# Patient Record
Sex: MALE | Race: WHITE | NOT HISPANIC OR LATINO | Employment: OTHER | URBAN - NONMETROPOLITAN AREA
[De-identification: names, ages, dates, MRNs, and addresses within clinical notes are randomized per-mention and may not be internally consistent; named-entity substitution may affect disease eponyms.]

---

## 2019-11-06 ENCOUNTER — OFFICE VISIT (OUTPATIENT)
Dept: CARDIOLOGY | Facility: CLINIC | Age: 69
End: 2019-11-06

## 2019-11-06 VITALS
WEIGHT: 208 LBS | BODY MASS INDEX: 32.65 KG/M2 | DIASTOLIC BLOOD PRESSURE: 64 MMHG | HEIGHT: 67 IN | OXYGEN SATURATION: 96 % | SYSTOLIC BLOOD PRESSURE: 130 MMHG | HEART RATE: 84 BPM

## 2019-11-06 DIAGNOSIS — I25.10 CORONARY ARTERY DISEASE INVOLVING NATIVE CORONARY ARTERY OF NATIVE HEART WITHOUT ANGINA PECTORIS: Primary | ICD-10-CM

## 2019-11-06 DIAGNOSIS — I99.8 SILENT ISCHEMIA: ICD-10-CM

## 2019-11-06 DIAGNOSIS — I10 ESSENTIAL HYPERTENSION: ICD-10-CM

## 2019-11-06 DIAGNOSIS — E10.65 TYPE 1 DIABETES MELLITUS WITH HYPERGLYCEMIA (HCC): ICD-10-CM

## 2019-11-06 DIAGNOSIS — I50.22 CHRONIC SYSTOLIC CONGESTIVE HEART FAILURE (HCC): ICD-10-CM

## 2019-11-06 DIAGNOSIS — E78.5 HYPERLIPIDEMIA, UNSPECIFIED HYPERLIPIDEMIA TYPE: ICD-10-CM

## 2019-11-06 PROCEDURE — 93000 ELECTROCARDIOGRAM COMPLETE: CPT | Performed by: INTERNAL MEDICINE

## 2019-11-06 PROCEDURE — 99204 OFFICE O/P NEW MOD 45 MIN: CPT | Performed by: INTERNAL MEDICINE

## 2019-11-06 RX ORDER — CYCLOBENZAPRINE HCL 10 MG
10 TABLET ORAL
COMMUNITY

## 2019-11-06 RX ORDER — SUCRALFATE 1 G/1
TABLET ORAL
Refills: 3 | COMMUNITY
Start: 2019-10-01

## 2019-11-06 RX ORDER — INFLUENZA A VIRUS A/MICHIGAN/45/2015 X-275 (H1N1) ANTIGEN (FORMALDEHYDE INACTIVATED), INFLUENZA A VIRUS A/SINGAPORE/INFIMH-16-0019/2016 IVR-186 (H3N2) ANTIGEN (FORMALDEHYDE INACTIVATED), AND INFLUENZA B VIRUS B/MARYLAND/15/2016 BX-69A (A B/COLORADO/6/2017-LIKE VIRUS) ANTIGEN (FORMALDEHYDE INACTIVATED) 60; 60; 60 UG/.5ML; UG/.5ML; UG/.5ML
INJECTION, SUSPENSION INTRAMUSCULAR
Refills: 0 | COMMUNITY
Start: 2019-10-17

## 2019-11-06 RX ORDER — BETAMETHASONE DIPROPIONATE 0.05 %
OINTMENT (GRAM) TOPICAL 2 TIMES DAILY
COMMUNITY

## 2019-11-06 RX ORDER — NITROGLYCERIN 0.4 MG/1
0.4 TABLET SUBLINGUAL
COMMUNITY
Start: 2013-05-16

## 2019-11-06 RX ORDER — TRAMADOL HYDROCHLORIDE 50 MG/1
50 TABLET ORAL
COMMUNITY
End: 2022-09-20

## 2019-11-06 RX ORDER — OLMESARTAN MEDOXOMIL, AMLODIPINE AND HYDROCHLOROTHIAZIDE TABLET 40/10/25 MG 40; 10; 25 MG/1; MG/1; MG/1
1 TABLET ORAL DAILY
Refills: 3 | COMMUNITY
Start: 2019-09-15

## 2019-11-06 RX ORDER — PRAVASTATIN SODIUM 80 MG/1
80 TABLET ORAL
COMMUNITY
Start: 2013-05-16

## 2019-11-06 RX ORDER — HEPATITIS A VACCINE 1440 [IU]/ML
INJECTION, SUSPENSION INTRAMUSCULAR
Refills: 0 | COMMUNITY
Start: 2019-10-17

## 2019-11-06 RX ORDER — ISOSORBIDE MONONITRATE 60 MG/1
60 TABLET, EXTENDED RELEASE ORAL DAILY
Refills: 3 | COMMUNITY
Start: 2019-10-01

## 2019-11-06 RX ORDER — GLIPIZIDE 5 MG/1
TABLET ORAL
Refills: 3 | COMMUNITY
Start: 2019-10-17

## 2019-11-06 RX ORDER — DIPHENHYDRAMINE HCL 25 MG
100 CAPSULE ORAL
COMMUNITY

## 2019-11-06 RX ORDER — CARVEDILOL 12.5 MG/1
12.5 TABLET ORAL
COMMUNITY
Start: 2013-05-16

## 2019-11-06 RX ORDER — FENOFIBRATE 145 MG/1
TABLET, COATED ORAL
Refills: 3 | COMMUNITY
Start: 2019-09-12

## 2019-11-06 RX ORDER — ASPIRIN 325 MG
325 TABLET, DELAYED RELEASE (ENTERIC COATED) ORAL
COMMUNITY

## 2019-11-06 RX ORDER — DAPAGLIFLOZIN 10 MG/1
TABLET, FILM COATED ORAL
COMMUNITY
Start: 2019-10-09

## 2019-11-06 RX ORDER — CLOPIDOGREL BISULFATE 75 MG/1
75 TABLET ORAL EVERY MORNING
Refills: 3 | COMMUNITY
Start: 2019-10-25

## 2019-11-06 RX ORDER — FLASH GLUCOSE SENSOR
KIT MISCELLANEOUS
COMMUNITY
Start: 2019-11-04

## 2019-11-06 RX ORDER — RANITIDINE 300 MG/1
300 TABLET ORAL DAILY
Refills: 3 | COMMUNITY
Start: 2019-09-12 | End: 2022-09-20

## 2019-11-06 RX ORDER — ACYCLOVIR 800 MG/1
TABLET ORAL
Refills: 0 | COMMUNITY
Start: 2019-10-03

## 2019-11-06 RX ORDER — FLURBIPROFEN SODIUM 0.3 MG/ML
SOLUTION/ DROPS OPHTHALMIC
Refills: 11 | COMMUNITY
Start: 2019-09-02

## 2019-11-06 NOTE — PROGRESS NOTES
"    Subjective:     Encounter Date:11/06/2019      Patient ID: Lester Maldonado is a 68 y.o. male.    Chief Complaint: Coronary artery disease  HPI  This is a 68-year-old former patient of mine from when I was practicing in [facility] who presents to our clinic for a second opinion regarding progression of coronary artery disease.  The patient had a \"silent\" myocardial infarction in 2010.  At that time the patient presented with fatigue and his wife was concerned that she thought his skin color was unusually pale and sallow.  The patient had no chest discomfort chest pressure or chest pain.  There was no shortness of breath nausea or diaphoresis.  His twelve-lead electrocardiogram however showed evidence of anterior ST elevation consistent with an evolving anterior ST elevation myocardial infarction.  Urgent coronary angiography disclosed an occluded proximal LAD which was treated with drug-eluting stent placement.  The patient did well over the next several years and has seen several cardiologist after I left the area.  The patient indicates that he recently changed cardiologist and was concerned based on his \"silent\" presentation that he had not had an ischemic evaluation in several years.  The patient underwent a vasodilator nuclear stress test which demonstrated what was thought to be inferior ischemia with normal viability of the anterior wall and a calculated ejection fraction of 48%.  His previous ejection fraction by echocardiogram had been 55%.  The patient underwent repeat coronary angiography last month in [facility] by 1 of my former colleagues.  This demonstrated complete occlusion of the proximal left anterior descending which appeared to have also been a \"silent\".  The patient had also developed an 80+ percent stenosis in a large proximal OM1.  The ostial left main had minor nonobstructive plaque.  The remainder of the distal circumflex had no focal obstructive disease and was a overall " small vessel consistent with diabetic-type atherosclerosis.  The right coronary artery was a large-caliber dominant vessel with 40-50% disease in the distal RCA and PDA.  Initially the patient was advised to continue ongoing medical therapy.  However, given his diabetes and mild decrease in left ventricular systolic function as well as coronary anatomy representing a left main equivalent, but more importantly, true silent ischemia, the patient and his wife requested a second opinion regarding additional options including surgical revascularization.  The patient currently leads an active lifestyle and denies exertional chest arm neck jaw shoulder back discomfort.  He denies having shortness of breath at rest or with activity.  He has no orthopnea PND or lower extremity edema.  There is no dizziness palpitations or syncope.  He has a history of hypertension, long-standing insulin requiring diabetes without renal failure and hyperlipidemia all of which are treated appropriately.  He is a former smoker but has not smoked in many years.  The following portions of the patient's history were reviewed and updated as appropriate: allergies, current medications, past family history, past medical history, past social history, past surgical history and problem  Review of Systems   Constitution: Negative for chills, diaphoresis, fever, weakness, malaise/fatigue, weight gain and weight loss.   HENT: Negative for ear discharge, hearing loss, hoarse voice and nosebleeds.    Eyes: Negative for discharge, double vision, pain and photophobia.   Cardiovascular: Negative for chest pain, claudication, cyanosis, dyspnea on exertion, irregular heartbeat, leg swelling, near-syncope, orthopnea, palpitations, paroxysmal nocturnal dyspnea and syncope.   Respiratory: Negative for cough, hemoptysis, shortness of breath, sputum production and wheezing.    Endocrine: Negative for cold intolerance, heat intolerance, polydipsia, polyphagia and  polyuria.   Hematologic/Lymphatic: Negative for adenopathy and bleeding problem. Does not bruise/bleed easily.   Skin: Negative for color change, flushing, itching and rash.   Musculoskeletal: Negative for muscle cramps, muscle weakness, myalgias and stiffness.   Gastrointestinal: Negative for abdominal pain, diarrhea, hematemesis, hematochezia, nausea and vomiting.   Genitourinary: Negative for dysuria, frequency and nocturia.   Neurological: Negative for focal weakness, loss of balance, numbness, paresthesias and seizures.   Psychiatric/Behavioral: Negative for altered mental status, hallucinations and suicidal ideas.   Allergic/Immunologic: Negative for HIV exposure, hives and persistent infections.           Current Outpatient Medications:   •  acyclovir (ZOVIRAX) 800 MG tablet, TAKE 1 TABLET EVERY 4 HOURS, 5 TIMES DAILY FOR 7 DAYS WHEN NEEDED FOR FEVER BLISTER, Disp: , Rfl: 0  •  aspirin  MG tablet, Take 325 mg by mouth., Disp: , Rfl:   •  B-D UF III MINI PEN NEEDLES 31G X 5 MM misc, FOUR TIMES DAILY WITH INSULIN PENS, Disp: , Rfl: 11  •  betamethasone dipropionate (DIPROLENE) 0.05 % ointment, Apply  topically to the appropriate area as directed 2 (Two) Times a Day., Disp: , Rfl:   •  carvedilol (COREG) 12.5 MG tablet, Take 12.5 mg by mouth., Disp: , Rfl:   •  clopidogrel (PLAVIX) 75 MG tablet, Take 75 mg by mouth Every Morning., Disp: , Rfl: 3  •  Continuous Blood Gluc Sensor (FREESTYLE HERIBERTO 14 DAY SENSOR) misc, , Disp: , Rfl:   •  cyclobenzaprine (FLEXERIL) 10 MG tablet, Take 10 mg by mouth., Disp: , Rfl:   •  diphenhydrAMINE (BENADRYL) 25 mg capsule, Take 100 mg by mouth., Disp: , Rfl:   •  FARXIGA 10 MG tablet, , Disp: , Rfl:   •  fenofibrate (TRICOR) 145 MG tablet, TAKE 1 TABLET DAILY AT 5:00 PM, Disp: , Rfl: 3  •  FLUZONE HIGH-DOSE 0.5 ML suspension prefilled syringe injection, TO BE ADMINISTERED BY PHARMACIST FOR IMMUNIZATION, Disp: , Rfl: 0  •  glipizide (GLUCOTROL) 5 MG tablet, TAKE 2 TABLETS  EVERY MORNING AND 1 1/2 TABLETS EVERY EVENING, Disp: , Rfl: 3  •  glucose blood test strip, FreeStyle Lite Test In Vitro 1 Strip three times daily for 90 days #300, Disp: , Rfl:   •  HAVRIX 1440 EL U/ML vaccine, TO BE ADMINISTERED BY PHARMACIST FOR IMMUNIZATION, Disp: , Rfl: 0  •  Insulin Aspart (NOVOLOG SC), Inject  under the skin into the appropriate area as directed., Disp: , Rfl:   •  Insulin Glargine (LANTUS SC), Inject  under the skin into the appropriate area as directed., Disp: , Rfl:   •  isosorbide mononitrate (IMDUR) 60 MG 24 hr tablet, Take 60 mg by mouth Daily., Disp: , Rfl: 3  •  Liraglutide (VICTOZA) 18 MG/3ML solution pen-injector injection, Inject 1.2 mg under the skin into the appropriate area as directed., Disp: , Rfl:   •  metFORMIN (GLUCOPHAGE) 1000 MG tablet, Take 500 mg by mouth., Disp: , Rfl:   •  nitroglycerin (NITROSTAT) 0.4 MG SL tablet, Place 0.4 mg under the tongue., Disp: , Rfl:   •  Olmesartan-amLODIPine-HCTZ 40-10-25 MG tablet, Take 1 tablet by mouth Daily., Disp: , Rfl: 3  •  Pediatric Multivit-Minerals-C (COMPLETE MULTI-VITAMIN PO), Take 1 tablet by mouth., Disp: , Rfl:   •  pravastatin (PRAVACHOL) 80 MG tablet, Take 80 mg by mouth., Disp: , Rfl:   •  raNITIdine (ZANTAC) 300 MG tablet, Take 300 mg by mouth Daily., Disp: , Rfl: 3  •  sucralfate (CARAFATE) 1 g tablet, TAKE ONE TABLET BY MOUTH 30 MINUTES BEFORE MEALS AND NIGHTLY AT BEDTIME, Disp: , Rfl: 3  •  traMADol (ULTRAM) 50 MG tablet, Take 50 mg by mouth., Disp: , Rfl:     Objective:   Physical Exam   Constitutional: He is oriented to person, place, and time. He appears well-developed and well-nourished. No distress.   HENT:   Head: Normocephalic and atraumatic.   Mouth/Throat: Oropharynx is clear and moist.   Eyes: Conjunctivae and EOM are normal. Pupils are equal, round, and reactive to light. No scleral icterus.   Neck: Normal range of motion. Neck supple. No JVD present. No tracheal deviation present. No thyromegaly present.  "  Cardiovascular: Normal rate, regular rhythm, S1 normal, S2 normal, normal heart sounds, intact distal pulses and normal pulses. PMI is not displaced. Exam reveals no gallop and no friction rub.   No murmur heard.  Pulmonary/Chest: Effort normal and breath sounds normal. No stridor. No respiratory distress. He has no wheezes. He has no rales.   Abdominal: Soft. Bowel sounds are normal. He exhibits no distension and no mass. There is no tenderness. There is no rebound and no guarding.   Musculoskeletal: Normal range of motion. He exhibits no edema, tenderness or deformity.   Neurological: He is alert and oriented to person, place, and time. He displays normal reflexes. No cranial nerve deficit. Coordination normal.   Skin: Skin is warm and dry. No rash noted. He is not diaphoretic. No erythema.   Psychiatric: He has a normal mood and affect. His behavior is normal. Thought content normal.     Blood pressure 130/64, pulse 84, height 170.2 cm (67\"), weight 94.3 kg (208 lb), SpO2 96 %.   Lab Review:     Assessment:       1. Coronary artery disease involving native coronary artery of native heart without angina pectoris  Documented silent ischemia with long-standing insulin requiring diabetes.    2. Hyperlipidemia, unspecified hyperlipidemia type  The patient is on appropriate high-dose statin based cholesterol-lowering therapy.    3. Type 1 diabetes mellitus with hyperglycemia (CMS/HCC)  The patient has been on insulin for several years in order to achieve blood sugar control.    4. Essential hypertension  Acceptable blood pressure control.    5. Silent ischemia  This is has been clearly documented with a previous silent myocardial infarction presentation as well as silent occlusion of his proximal LAD stent.    6. Chronic systolic congestive heart failure (CMS/HCC)  Heart failure with midrange ejection fraction.  Stage B.  Euvolemic.  New York Heart Association functional class I symptoms.      ECG 12 Lead  Date/Time: " 11/6/2019 10:27 AM  Performed by: Alexey Ramos MD  Authorized by: Alexey Ramos MD   Previous ECG: no previous ECG available  Rhythm: sinus rhythm  Rate: normal  Q waves: V1, V2, V3 and V4    QRS axis: left    Clinical impression: abnormal EKG            Plan:     I have reviewed his coronary angiograms on disc.  His LAD is marginal in caliber but with the right surgeon at the right facility I suspect a LIMA graft could be placed to the LAD distal to his stent.  The OM1 and right coronary artery are certainly suitable for bypass grafting.  I have recommended the patient be evaluated by Dr. Holloway at [facility] for the option of surgical revascularization.  His nuclear stress testing shows viability in the anterior wall and his coronary angiographic anatomy amounts to a left main equivalent.  Given his diabetes he would have a survival benefit from coronary artery bypass surgery particularly if an LIMA graft can be placed to the LAD.  Of more concern is his clear evidence of multiple prior episodes of silent myocardial infarction\silent myocardial ischemia.  The patient is willing to make the 9-hour round trip to see Dr. Holloway as an outpatient and is informed to bring his compact disc with the cardiac catheterization angiograms with him.  If the patient is not deemed to be a candidate for surgical revascularization we can treat his OM1 disease with drug-eluting stent placement.  Patient is encouraged to maintain an active lifestyle and focus on aggressive secondary risk factor modification.

## 2019-11-07 ENCOUNTER — TELEPHONE (OUTPATIENT)
Dept: CARDIOLOGY | Facility: CLINIC | Age: 69
End: 2019-11-07

## 2020-06-15 ENCOUNTER — TELEMEDICINE (OUTPATIENT)
Dept: CARDIOLOGY | Facility: CLINIC | Age: 70
End: 2020-06-15

## 2020-06-15 DIAGNOSIS — I10 ESSENTIAL HYPERTENSION: ICD-10-CM

## 2020-06-15 DIAGNOSIS — E78.5 DYSLIPIDEMIA: Primary | ICD-10-CM

## 2020-06-15 DIAGNOSIS — Z01.810 PRE-OPERATIVE CARDIOVASCULAR EXAMINATION: ICD-10-CM

## 2020-06-15 DIAGNOSIS — I50.22 CHRONIC SYSTOLIC CONGESTIVE HEART FAILURE (HCC): ICD-10-CM

## 2020-06-15 DIAGNOSIS — I25.10 CORONARY ARTERY DISEASE INVOLVING NATIVE CORONARY ARTERY OF NATIVE HEART WITHOUT ANGINA PECTORIS: ICD-10-CM

## 2020-06-15 PROCEDURE — 99213 OFFICE O/P EST LOW 20 MIN: CPT | Performed by: INTERNAL MEDICINE

## 2020-06-15 NOTE — PROGRESS NOTES
Subjective:     Encounter Date:06/15/2020      Patient ID: Lester Maldonado is a 69 y.o. male.    Chief Complaint: Preoperative cardiovascular evaluation.  HPI  This is a 69-year-old male patient with known coronary artery disease who underwent multivessel coronary artery bypass surgery at [facility].  His surgery was performed without issue or complications.  He has now completed cardiac rehab in his hometown of [facility].  He is planning to undergo orthopedic surgery in [place].  The patient has no chest discomfort at rest or with activity.  There is no exertional chest arm neck jaw shoulder or back discomfort.  There is no orthopnea PND or lower extremity edema.  There is no dizziness palpitations or syncope.  He has no shortness of breath at rest or with activity.  He is a reformed smoker.  He is remained exertionally active with the COVID restrictions without symptoms or limitations.  The following portions of the patient's history were reviewed and updated as appropriate: allergies, current medications, past family history, past medical history, past social history, past surgical history and problem  Review of Systems   Constitution: Negative for chills, diaphoresis, fever, malaise/fatigue, weight gain and weight loss.   HENT: Negative for ear discharge, hearing loss, hoarse voice and nosebleeds.    Eyes: Negative for discharge, double vision, pain and photophobia.   Cardiovascular: Negative for chest pain, claudication, cyanosis, dyspnea on exertion, irregular heartbeat, leg swelling, near-syncope, orthopnea, palpitations, paroxysmal nocturnal dyspnea and syncope.   Respiratory: Negative for cough, hemoptysis, shortness of breath, sputum production and wheezing.    Endocrine: Negative for cold intolerance, heat intolerance, polydipsia, polyphagia and polyuria.   Hematologic/Lymphatic: Negative for adenopathy and bleeding problem. Does not bruise/bleed easily.   Skin: Negative  for color change, flushing, itching and rash.   Musculoskeletal: Negative for muscle cramps, muscle weakness, myalgias and stiffness.   Gastrointestinal: Negative for abdominal pain, diarrhea, hematemesis, hematochezia, nausea and vomiting.   Genitourinary: Negative for dysuria, frequency and nocturia.   Neurological: Negative for focal weakness, loss of balance, numbness, paresthesias and seizures.   Psychiatric/Behavioral: Negative for altered mental status, hallucinations and suicidal ideas.   Allergic/Immunologic: Negative for HIV exposure, hives and persistent infections.           Current Outpatient Medications:   •  acyclovir (ZOVIRAX) 800 MG tablet, TAKE 1 TABLET EVERY 4 HOURS, 5 TIMES DAILY FOR 7 DAYS WHEN NEEDED FOR FEVER BLISTER, Disp: , Rfl: 0  •  aspirin  MG tablet, Take 325 mg by mouth., Disp: , Rfl:   •  B-D UF III MINI PEN NEEDLES 31G X 5 MM misc, FOUR TIMES DAILY WITH INSULIN PENS, Disp: , Rfl: 11  •  betamethasone dipropionate (DIPROLENE) 0.05 % ointment, Apply  topically to the appropriate area as directed 2 (Two) Times a Day., Disp: , Rfl:   •  carvedilol (COREG) 12.5 MG tablet, Take 12.5 mg by mouth., Disp: , Rfl:   •  clopidogrel (PLAVIX) 75 MG tablet, Take 75 mg by mouth Every Morning., Disp: , Rfl: 3  •  Continuous Blood Gluc Sensor (FREESTYLE HERIBERTO 14 DAY SENSOR) misc, , Disp: , Rfl:   •  cyclobenzaprine (FLEXERIL) 10 MG tablet, Take 10 mg by mouth., Disp: , Rfl:   •  diphenhydrAMINE (BENADRYL) 25 mg capsule, Take 100 mg by mouth., Disp: , Rfl:   •  FARXIGA 10 MG tablet, , Disp: , Rfl:   •  fenofibrate (TRICOR) 145 MG tablet, TAKE 1 TABLET DAILY AT 5:00 PM, Disp: , Rfl: 3  •  FLUZONE HIGH-DOSE 0.5 ML suspension prefilled syringe injection, TO BE ADMINISTERED BY PHARMACIST FOR IMMUNIZATION, Disp: , Rfl: 0  •  glipizide (GLUCOTROL) 5 MG tablet, TAKE 2 TABLETS EVERY MORNING AND 1 1/2 TABLETS EVERY EVENING, Disp: , Rfl: 3  •  glucose blood test strip, FreeStyle Lite Test In Vitro 1 Strip  three times daily for 90 days #300, Disp: , Rfl:   •  HAVRIX 1440 EL U/ML vaccine, TO BE ADMINISTERED BY PHARMACIST FOR IMMUNIZATION, Disp: , Rfl: 0  •  Insulin Glargine (LANTUS SC), Inject  under the skin into the appropriate area as directed., Disp: , Rfl:   •  isosorbide mononitrate (IMDUR) 60 MG 24 hr tablet, Take 60 mg by mouth Daily., Disp: , Rfl: 3  •  Liraglutide (VICTOZA) 18 MG/3ML solution pen-injector injection, Inject 1.2 mg under the skin into the appropriate area as directed., Disp: , Rfl:   •  metFORMIN (GLUCOPHAGE) 1000 MG tablet, Take 500 mg by mouth., Disp: , Rfl:   •  nitroglycerin (NITROSTAT) 0.4 MG SL tablet, Place 0.4 mg under the tongue., Disp: , Rfl:   •  Olmesartan-amLODIPine-HCTZ 40-10-25 MG tablet, Take 1 tablet by mouth Daily., Disp: , Rfl: 3  •  Pediatric Multivit-Minerals-C (COMPLETE MULTI-VITAMIN PO), Take 1 tablet by mouth., Disp: , Rfl:   •  pravastatin (PRAVACHOL) 80 MG tablet, Take 80 mg by mouth., Disp: , Rfl:   •  sucralfate (CARAFATE) 1 g tablet, TAKE ONE TABLET BY MOUTH 30 MINUTES BEFORE MEALS AND NIGHTLY AT BEDTIME, Disp: , Rfl: 3  •  traMADol (ULTRAM) 50 MG tablet, Take 50 mg by mouth., Disp: , Rfl:   •  Insulin Aspart (NOVOLOG SC), Inject  under the skin into the appropriate area as directed., Disp: , Rfl:   •  raNITIdine (ZANTAC) 300 MG tablet, Take 300 mg by mouth Daily., Disp: , Rfl: 3    Objective:   There were no vitals taken for this visit.  N/A- Video visit  Lab Review:     Assessment:       1. Dyslipidemia  This is followed closely by his primary care provider.  He is on high-dose, medium potency statin based cholesterol-lowering therapy.  His goal LDL cholesterol is less than 70 mg/dL.    2. Pre-operative cardiovascular examination  The patient is considered low risk of a cardiovascular complication from his planned orthopedic surgery.    3. Essential hypertension  Acceptable blood pressure control by the patient's home blood pressure recordings.    4. Coronary  artery disease involving native coronary artery of native heart without angina pectoris  Stable and angina free with active lifestyle.  The patient has undergone successful coronary artery bypass surgery at [facility].  He is completed cardiac rehab in [facility] with excellent results and no cardiovascular issues.    5. Chronic systolic congestive heart failure (CMS/HCC)  Heart failure with reduced ejection fraction.  Stage C.  New York Heart Association functional class I symptoms.  Euvolemic by history.  Mild decrease in global left ventricular systolic function.    Procedures    Plan:     This was a video visit via zoom with the patient's consent.  Length of service: 10 minutes  From a cardiovascular perspective the patient is cleared at low risk of a cardiac complication from planned orthopedic surgery.  Surgery may proceed without further delay testing or change in medications.  The patient's aspirin and Plavix therapy may be interrupted for 5 days prior to surgery with plans to restart postoperatively once hemostasis is achieved.

## 2022-09-20 ENCOUNTER — OFFICE VISIT (OUTPATIENT)
Dept: CARDIOLOGY | Facility: CLINIC | Age: 72
End: 2022-09-20

## 2022-09-20 VITALS
HEIGHT: 67 IN | HEART RATE: 76 BPM | OXYGEN SATURATION: 93 % | BODY MASS INDEX: 32.18 KG/M2 | DIASTOLIC BLOOD PRESSURE: 78 MMHG | SYSTOLIC BLOOD PRESSURE: 132 MMHG | WEIGHT: 205 LBS

## 2022-09-20 DIAGNOSIS — Z01.810 PRE-OPERATIVE CARDIOVASCULAR EXAMINATION: ICD-10-CM

## 2022-09-20 DIAGNOSIS — I20.8 ANGINAL EQUIVALENT: ICD-10-CM

## 2022-09-20 DIAGNOSIS — E78.5 DYSLIPIDEMIA: ICD-10-CM

## 2022-09-20 DIAGNOSIS — I99.8 SILENT ISCHEMIA: ICD-10-CM

## 2022-09-20 DIAGNOSIS — I10 ESSENTIAL HYPERTENSION: ICD-10-CM

## 2022-09-20 DIAGNOSIS — I25.10 CORONARY ARTERY DISEASE INVOLVING NATIVE CORONARY ARTERY OF NATIVE HEART WITHOUT ANGINA PECTORIS: ICD-10-CM

## 2022-09-20 DIAGNOSIS — I50.22 CHRONIC SYSTOLIC CONGESTIVE HEART FAILURE: Primary | ICD-10-CM

## 2022-09-20 DIAGNOSIS — E66.2 CLASS 1 OBESITY WITH ALVEOLAR HYPOVENTILATION, SERIOUS COMORBIDITY, AND BODY MASS INDEX (BMI) OF 32.0 TO 32.9 IN ADULT: ICD-10-CM

## 2022-09-20 DIAGNOSIS — E10.65 TYPE 1 DIABETES MELLITUS WITH HYPERGLYCEMIA: ICD-10-CM

## 2022-09-20 DIAGNOSIS — N18.31 STAGE 3A CHRONIC KIDNEY DISEASE: ICD-10-CM

## 2022-09-20 PROCEDURE — 99214 OFFICE O/P EST MOD 30 MIN: CPT | Performed by: INTERNAL MEDICINE

## 2022-09-20 RX ORDER — MOXIFLOXACIN 5 MG/ML
SOLUTION/ DROPS OPHTHALMIC
COMMUNITY

## 2022-09-20 RX ORDER — RANITIDINE 300 MG/1
TABLET ORAL
COMMUNITY

## 2022-09-20 RX ORDER — PRAMIPEXOLE DIHYDROCHLORIDE 0.12 MG/1
TABLET ORAL
COMMUNITY

## 2022-09-20 RX ORDER — TRAMADOL HYDROCHLORIDE 50 MG/1
TABLET ORAL
COMMUNITY

## 2022-09-20 RX ORDER — FENOFIBRATE 145 MG/1
TABLET, COATED ORAL
COMMUNITY

## 2022-09-20 RX ORDER — DEXLANSOPRAZOLE 30 MG/1
CAPSULE, DELAYED RELEASE ORAL
COMMUNITY
Start: 2022-06-17

## 2022-09-20 RX ORDER — FAMOTIDINE 40 MG/1
TABLET, FILM COATED ORAL
COMMUNITY

## 2022-09-20 RX ORDER — ACYCLOVIR 400 MG/1
TABLET ORAL
COMMUNITY
Start: 2022-06-19

## 2022-09-20 RX ORDER — HYDROCODONE BITARTRATE AND ACETAMINOPHEN 5; 325 MG/1; MG/1
TABLET ORAL
COMMUNITY
Start: 2022-08-19 | End: 2022-09-20

## 2022-09-20 RX ORDER — ACYCLOVIR 50 MG/G
OINTMENT TOPICAL
COMMUNITY

## 2022-09-20 NOTE — PROGRESS NOTES
"    Subjective:     Encounter Date:09/20/2022      Patient ID: Lester Maldonado is a 71 y.o. male.    Chief Complaint: Coronary artery disease  HPI  This is a 71-year-old male patient with known coronary artery disease who underwent two-vessel CABG and 2019 (LIMA to LAD and vein graft to OM) who has been experiencing symptoms similar to diagnosis of his coronary artery disease.  With the patient's initial diagnosis he had no chest discomfort or shortness of breath.  His primary complaint at that time was excessive daytime sleepiness.  There is concerned the patient has \"silent ischemia\" based on his severe diabetes.  He reports compliance with his medications with no perceived side effects.  He remains a reformed smoker.  The following portions of the patient's history were reviewed and updated as appropriate: allergies, current medications, past family history, past medical history, past social history, past surgical history and problem  Review of Systems   Constitutional: Negative for chills, diaphoresis, fever, malaise/fatigue, weight gain and weight loss.   HENT: Negative for ear discharge, hearing loss, hoarse voice and nosebleeds.    Eyes: Negative for discharge, double vision, pain and photophobia.   Cardiovascular: Negative for chest pain, claudication, cyanosis, dyspnea on exertion, irregular heartbeat, leg swelling, near-syncope, orthopnea, palpitations, paroxysmal nocturnal dyspnea and syncope.   Respiratory: Negative for cough, hemoptysis, shortness of breath, sputum production and wheezing.    Endocrine: Negative for cold intolerance, heat intolerance, polydipsia, polyphagia and polyuria.   Hematologic/Lymphatic: Negative for adenopathy and bleeding problem. Does not bruise/bleed easily.   Skin: Negative for color change, flushing, itching and rash.   Musculoskeletal: Negative for muscle cramps, muscle weakness, myalgias and stiffness.   Gastrointestinal: Negative for abdominal pain, diarrhea, hematemesis, " hematochezia, nausea and vomiting.   Genitourinary: Negative for dysuria, frequency and nocturia.   Neurological: Positive for excessive daytime sleepiness. Negative for focal weakness, loss of balance, numbness, paresthesias and seizures.   Psychiatric/Behavioral: Negative for altered mental status, hallucinations and suicidal ideas.   Allergic/Immunologic: Negative for HIV exposure, hives and persistent infections.           Current Outpatient Medications:   •  acyclovir (ZOVIRAX) 400 MG tablet, TAKE 1 TABLET BY MOUTH 3 TIMES A DAY FOR 5 DAYS FOR RECURRENT EPISODED, Disp: , Rfl:   •  acyclovir (ZOVIRAX) 5 % ointment, acyclovir 5 % topical ointment, Disp: , Rfl:   •  acyclovir (ZOVIRAX) 800 MG tablet, TAKE 1 TABLET EVERY 4 HOURS, 5 TIMES DAILY FOR 7 DAYS WHEN NEEDED FOR FEVER BLISTER, Disp: , Rfl: 0  •  aspirin  MG tablet, Take 325 mg by mouth., Disp: , Rfl:   •  B-D UF III MINI PEN NEEDLES 31G X 5 MM misc, FOUR TIMES DAILY WITH INSULIN PENS, Disp: , Rfl: 11  •  betamethasone dipropionate (DIPROLENE) 0.05 % ointment, Apply  topically to the appropriate area as directed 2 (Two) Times a Day., Disp: , Rfl:   •  carvedilol (COREG) 12.5 MG tablet, Take 12.5 mg by mouth., Disp: , Rfl:   •  clopidogrel (PLAVIX) 75 MG tablet, Take 75 mg by mouth Every Morning., Disp: , Rfl: 3  •  Continuous Blood Gluc Sensor (FREESTYLE HERIBERTO 14 DAY SENSOR) misc, , Disp: , Rfl:   •  cyclobenzaprine (FLEXERIL) 10 MG tablet, Take 10 mg by mouth., Disp: , Rfl:   •  dexlansoprazole (DEXILANT) 30 MG capsule, , Disp: , Rfl:   •  diphenhydrAMINE (BENADRYL) 25 mg capsule, Take 100 mg by mouth., Disp: , Rfl:   •  famotidine (PEPCID) 40 MG tablet, famotidine 40 mg tablet  TAKE 1 TABLET BY MOUTH EVERY DAY, Disp: , Rfl:   •  FARXIGA 10 MG tablet, , Disp: , Rfl:   •  fenofibrate (TRICOR) 145 MG tablet, TAKE 1 TABLET DAILY AT 5:00 PM, Disp: , Rfl: 3  •  fenofibrate (TRICOR) 145 MG tablet, fenofibrate nanocrystallized 145 mg tablet, Disp: , Rfl:   •   Fluticasone-Umeclidin-Vilant (Trelegy Ellipta) 100-62.5-25 MCG/INH inhaler, Trelegy Ellipta 200 mcg-62.5 mcg-25 mcg powder for inhalation, Disp: , Rfl:   •  FLUZONE HIGH-DOSE 0.5 ML suspension prefilled syringe injection, TO BE ADMINISTERED BY PHARMACIST FOR IMMUNIZATION, Disp: , Rfl: 0  •  glipizide (GLUCOTROL) 5 MG tablet, TAKE 2 TABLETS EVERY MORNING AND 1 1/2 TABLETS EVERY EVENING, Disp: , Rfl: 3  •  glucose blood test strip, FreeStyle Lite Test In Vitro 1 Strip three times daily for 90 days #300, Disp: , Rfl:   •  HAVRIX 1440 EL U/ML vaccine, TO BE ADMINISTERED BY PHARMACIST FOR IMMUNIZATION, Disp: , Rfl: 0  •  Insulin Aspart (NOVOLOG SC), Inject  under the skin into the appropriate area as directed., Disp: , Rfl:   •  Insulin Glargine (LANTUS SC), Inject  under the skin into the appropriate area as directed., Disp: , Rfl:   •  isosorbide mononitrate (IMDUR) 60 MG 24 hr tablet, Take 60 mg by mouth Daily., Disp: , Rfl: 3  •  Liraglutide (VICTOZA) 18 MG/3ML solution pen-injector injection, Inject 1.2 mg under the skin into the appropriate area as directed., Disp: , Rfl:   •  metFORMIN (GLUCOPHAGE) 1000 MG tablet, Take 500 mg by mouth., Disp: , Rfl:   •  metFORMIN (GLUCOPHAGE) 500 MG tablet, Take 500 mg by mouth 2 (Two) Times a Day., Disp: , Rfl:   •  moxifloxacin (VIGAMOX) 0.5 % ophthalmic solution, moxifloxacin 0.5 % eye drops, Disp: , Rfl:   •  nitroglycerin (NITROSTAT) 0.4 MG SL tablet, Place 0.4 mg under the tongue., Disp: , Rfl:   •  Olmesartan-amLODIPine-HCTZ 40-10-25 MG tablet, Take 1 tablet by mouth Daily., Disp: , Rfl: 3  •  Pediatric Multivit-Minerals-C (COMPLETE MULTI-VITAMIN PO), Take 1 tablet by mouth., Disp: , Rfl:   •  pramipexole (MIRAPEX) 0.125 MG tablet, pramipexole 0.125 mg tablet  TAKE 1 TABLET BY MOUTH EVERYDAY AT BEDTIME, Disp: , Rfl:   •  pravastatin (PRAVACHOL) 80 MG tablet, Take 80 mg by mouth., Disp: , Rfl:   •  raNITIdine (ZANTAC) 300 MG tablet, ranitidine 300 mg tablet, Disp: , Rfl:  "  •  sucralfate (CARAFATE) 1 g tablet, TAKE ONE TABLET BY MOUTH 30 MINUTES BEFORE MEALS AND NIGHTLY AT BEDTIME, Disp: , Rfl: 3  •  traMADol (ULTRAM) 50 MG tablet, tramadol 50 mg tablet, Disp: , Rfl:     Objective:   Vitals and nursing note reviewed.   Constitutional:       Appearance: Healthy appearance. Not in distress.   Neck:      Vascular: No JVR. JVD normal.   Pulmonary:      Effort: Pulmonary effort is normal.      Breath sounds: Normal breath sounds. No wheezing. No rhonchi. No rales.   Chest:      Chest wall: Not tender to palpatation.   Cardiovascular:      PMI at left midclavicular line. Normal rate. Regular rhythm. Normal S1. Normal S2.      Murmurs: There is no murmur.      No gallop. No click. No rub.   Pulses:     Intact distal pulses.   Edema:     Peripheral edema absent.   Abdominal:      General: Bowel sounds are normal.      Palpations: Abdomen is soft.      Tenderness: There is no abdominal tenderness.   Musculoskeletal: Normal range of motion.         General: No tenderness. Skin:     General: Skin is warm and dry.   Neurological:      General: No focal deficit present.      Mental Status: Alert and oriented to person, place and time.       Blood pressure 132/78, pulse 76, height 170.2 cm (67\"), weight 93 kg (205 lb), SpO2 93 %.   Lab Review:     Assessment:       1. Chronic systolic congestive heart failure (HCC)  The patient has had not had a recent echocardiogram.  Left ventricular ejection fraction is unknown.  Stage C heart failure.  New York Heart Association functional class I symptoms.  Euvolemic.  - Stress Test With Myocardial Perfusion Two Day  - Adult Transthoracic Echo Complete W/ Cont if Necessary Per Protocol    2. Coronary artery disease involving native coronary artery of native heart with possible angina pectoris  Previous two-vessel CABG at [facility].  - Stress Test With Myocardial Perfusion Two Day  - Adult Transthoracic Echo Complete W/ Cont if " Necessary Per Protocol    3. Dyslipidemia  The patient is tolerating statin based cholesterol-lowering therapy.  The patient indicates his primary care provider recently did blood work but he is not sure if a fasting lipid profile was checked.  He is unaware of his cholesterol numbers.    4. Essential hypertension  Acceptable blood pressure control.  - Adult Transthoracic Echo Complete W/ Cont if Necessary Per Protocol    5. Type 1 diabetes mellitus with hyperglycemia ([facility])  Insulin requiring type 2 diabetes mellitus.  Typical obesity related insulin resistance.    6. Pre-operative cardiovascular examination  The patient is advised that he may hold Plavix therapy prior to planned colonoscopy.    7. Stage 3a chronic kidney disease ([facility])  The patient is unaware of his current renal function and does not have a report of his most recent lab work with him.    8. Silent ischemia  Presumptive diagnosis related to advanced diabetes.    9. Anginal equivalent ([facility])  The patient and his wife identifies excessive daytime somnolence as the only symptom he was experiencing prior to the diagnosis of his coronary artery disease.  He indicates that he is experiencing the same symptoms at present.  He has not had an ischemic evaluation since 2019.  He is unable to do treadmill exercise stress testing.  He has multiple risk factors for atherosclerosis progression and vein graft degeneration.    10. Class 1 obesity with serious comorbidity, and body mass index (BMI) of 32.0 to 32.9 in adult ([facility])      Procedures    Plan:     Advance Care Planning   ACP discussion was held with the patient during this visit. Patient has an advance directive (not in EMR), copy requested.     I have recommended a vasodilator nuclear stress test utilizing a 2-day imaging protocol with both supine and prone stress images in order to mitigate for attenuation artifact which is anticipated given the patient body habitus.    I have recommended an  echocardiogram.    The importance of diet exercise and weight management cannot be overemphasize.    No changes to medications have been made at today's visit.    Further recommendations will be predicated on the results of his outpatient testing.